# Patient Record
Sex: MALE | Race: WHITE | NOT HISPANIC OR LATINO | URBAN - NONMETROPOLITAN AREA
[De-identification: names, ages, dates, MRNs, and addresses within clinical notes are randomized per-mention and may not be internally consistent; named-entity substitution may affect disease eponyms.]

---

## 2018-07-02 ENCOUNTER — NEW PATIENT (OUTPATIENT)
Dept: URBAN - NONMETROPOLITAN AREA CLINIC 12 | Facility: CLINIC | Age: 73
End: 2018-07-02
Payer: MEDICARE

## 2018-07-02 DIAGNOSIS — H52.4 PRESBYOPIA: ICD-10-CM

## 2018-07-02 DIAGNOSIS — H25.043 POSTERIOR SUBCAPSULAR POLAR AGE-RELATED CATARACT, BILATERAL: Primary | ICD-10-CM

## 2018-07-02 PROCEDURE — 92004 COMPRE OPH EXAM NEW PT 1/>: CPT | Performed by: OPTOMETRIST

## 2018-07-02 PROCEDURE — 92015 DETERMINE REFRACTIVE STATE: CPT | Performed by: OPTOMETRIST

## 2018-07-02 ASSESSMENT — VISUAL ACUITY
OS: 20/20
OD: 20/20

## 2018-07-02 ASSESSMENT — INTRAOCULAR PRESSURE
OD: 15
OS: 14

## 2023-07-12 ENCOUNTER — OFFICE VISIT (OUTPATIENT)
Dept: URBAN - NONMETROPOLITAN AREA CLINIC 13 | Facility: CLINIC | Age: 78
End: 2023-07-12
Payer: MEDICARE

## 2023-07-12 DIAGNOSIS — H17.823 PERIPHERAL OPACITY OF CORNEA, BILATERAL: ICD-10-CM

## 2023-07-12 DIAGNOSIS — H25.813 COMBINED FORMS OF AGE-RELATED CATARACT, BILATERAL: Primary | ICD-10-CM

## 2023-07-12 PROCEDURE — 92004 COMPRE OPH EXAM NEW PT 1/>: CPT | Performed by: OPTOMETRIST

## 2023-07-12 ASSESSMENT — INTRAOCULAR PRESSURE
OS: 18
OD: 14

## 2023-07-12 ASSESSMENT — VISUAL ACUITY
OD: 20/20
OS: 20/25

## 2023-07-12 NOTE — IMPRESSION/PLAN
Impression: Combined forms of age-related cataract, bilateral: H25.813. Plan: Cataracts account for the patient's complaints. No treatment currently recommended. The patient will monitor vision changes and contact us with any decrease in vision. Statement Selected

## 2023-07-12 NOTE — IMPRESSION/PLAN
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 Plan: pt has mild vortex changes 2' to heart med, needs annual check as only mild today, but pt ed on s/s of worsening issue rtc asap, tears daily OU rec'd.

## 2025-01-20 ENCOUNTER — OFFICE VISIT (OUTPATIENT)
Dept: URBAN - NONMETROPOLITAN AREA CLINIC 14 | Facility: CLINIC | Age: 80
End: 2025-01-20
Payer: MEDICARE

## 2025-01-20 DIAGNOSIS — H43.813 VITREOUS DEGENERATION, BILATERAL: ICD-10-CM

## 2025-01-20 DIAGNOSIS — H25.813 COMBINED FORMS OF AGE-RELATED CATARACT, BILATERAL: Primary | ICD-10-CM

## 2025-01-20 DIAGNOSIS — H17.823 PERIPHERAL OPACITY OF CORNEA, BILATERAL: ICD-10-CM

## 2025-01-20 PROCEDURE — 99214 OFFICE O/P EST MOD 30 MIN: CPT | Performed by: OPTOMETRIST

## 2025-01-20 ASSESSMENT — VISUAL ACUITY
OD: 20/25
OS: 20/30

## 2025-01-20 ASSESSMENT — INTRAOCULAR PRESSURE
OS: 17
OD: 15